# Patient Record
Sex: FEMALE | Race: WHITE | NOT HISPANIC OR LATINO | Employment: FULL TIME | URBAN - METROPOLITAN AREA
[De-identification: names, ages, dates, MRNs, and addresses within clinical notes are randomized per-mention and may not be internally consistent; named-entity substitution may affect disease eponyms.]

---

## 2017-03-28 ENCOUNTER — HOSPITAL ENCOUNTER (OUTPATIENT)
Dept: FAMILY MEDICINE CLINIC | Facility: CLINIC | Age: 22
Setting detail: SPECIMEN
Discharge: HOME | End: 2017-03-28
Attending: FAMILY MEDICINE | Admitting: FAMILY MEDICINE

## 2017-03-28 LAB
ANION GAP SERPL CALC-SCNC: 11.9 MMOL/L (ref 10–20)
BASOPHILS # BLD AUTO: 0.1 10*3/UL (ref 0–0.2)
BASOPHILS NFR BLD AUTO: 1 % (ref 0–2)
BUN SERPL-MCNC: 13 MG/DL (ref 8–20)
BUN/CREAT SERPL: 16.3 (ref 5.4–26.2)
CALCIUM SERPL-MCNC: 9.5 MG/DL (ref 8.9–10.3)
CHLORIDE SERPL-SCNC: 105 MMOL/L (ref 101–111)
CONV CO2: 26 MMOL/L (ref 22–32)
CREAT UR-MCNC: 0.8 MG/DL (ref 0.4–1)
DIFFERENTIAL METHOD BLD: (no result)
EOSINOPHIL # BLD AUTO: 0 10*3/UL (ref 0–0.3)
EOSINOPHIL # BLD AUTO: 1 % (ref 0–3)
ERYTHROCYTE [DISTWIDTH] IN BLOOD BY AUTOMATED COUNT: 14.7 % (ref 11.5–14.5)
GLUCOSE SERPL-MCNC: 88 MG/DL (ref 65–99)
HCT VFR BLD AUTO: 37.2 % (ref 35–49)
HGB BLD-MCNC: 12.7 G/DL (ref 12–15)
LYMPHOCYTES # BLD AUTO: 1.4 10*3/UL (ref 0.8–4.8)
LYMPHOCYTES NFR BLD AUTO: 27 % (ref 18–42)
MCH RBC QN AUTO: 28.9 PG (ref 26–32)
MCHC RBC AUTO-ENTMCNC: 34.1 G/DL (ref 32–36)
MCV RBC AUTO: 84.6 FL (ref 80–94)
MONOCYTES # BLD AUTO: 0.3 10*3/UL (ref 0.1–1.3)
MONOCYTES NFR BLD AUTO: 6 % (ref 2–11)
NEUTROPHILS # BLD AUTO: 3.3 10*3/UL (ref 2.3–8.6)
NEUTROPHILS NFR BLD AUTO: 65 % (ref 50–75)
NRBC BLD AUTO-RTO: 0 /100{WBCS}
NRBC/RBC NFR BLD MANUAL: 0 10*3/UL
PLATELET # BLD AUTO: 144 10*3/UL (ref 150–450)
PMV BLD AUTO: 11.2 FL (ref 7.4–10.4)
POTASSIUM SERPL-SCNC: 3.9 MMOL/L (ref 3.6–5.1)
RBC # BLD AUTO: 4.4 10*6/UL (ref 4–5.4)
SODIUM SERPL-SCNC: 139 MMOL/L (ref 136–144)
T3FREE SERPL-MCNC: 4.07 PG/ML (ref 2.39–6.79)
T4 FREE SERPL-MCNC: 0.82 NG/DL (ref 0.58–1.64)
TSH SERPL-ACNC: 1.45 UIU/ML (ref 0.34–5.6)
WBC # BLD AUTO: 5.1 10*3/UL (ref 4.5–11.5)

## 2017-04-07 ENCOUNTER — HOSPITAL ENCOUNTER (OUTPATIENT)
Dept: ONCOLOGY | Facility: CLINIC | Age: 22
Discharge: HOME | End: 2017-04-07
Attending: INTERNAL MEDICINE | Admitting: INTERNAL MEDICINE

## 2017-07-06 ENCOUNTER — HOSPITAL ENCOUNTER (OUTPATIENT)
Dept: ONCOLOGY | Facility: CLINIC | Age: 22
Discharge: HOME | End: 2017-07-06
Attending: INTERNAL MEDICINE | Admitting: INTERNAL MEDICINE

## 2018-08-14 ENCOUNTER — HOSPITAL ENCOUNTER (OUTPATIENT)
Dept: FAMILY MEDICINE CLINIC | Facility: CLINIC | Age: 23
Setting detail: SPECIMEN
Discharge: HOME | End: 2018-08-14
Attending: FAMILY MEDICINE | Admitting: FAMILY MEDICINE

## 2018-08-14 LAB
ANION GAP SERPL CALC-SCNC: 12.2 MMOL/L (ref 10–20)
BASOPHILS # BLD AUTO: 0 10*3/UL (ref 0–0.2)
BASOPHILS NFR BLD AUTO: 0 % (ref 0–2)
BUN SERPL-MCNC: 8 MG/DL (ref 8–20)
BUN/CREAT SERPL: 8.9 (ref 5.4–26.2)
CALCIUM SERPL-MCNC: 9.4 MG/DL (ref 8.9–10.3)
CHLORIDE SERPL-SCNC: 102 MMOL/L (ref 101–111)
CONV CO2: 24 MMOL/L (ref 22–32)
CREAT UR-MCNC: 0.9 MG/DL (ref 0.4–1)
DIFFERENTIAL METHOD BLD: (no result)
EOSINOPHIL # BLD AUTO: 0 % (ref 0–3)
EOSINOPHIL # BLD AUTO: 0 10*3/UL (ref 0–0.3)
ERYTHROCYTE [DISTWIDTH] IN BLOOD BY AUTOMATED COUNT: 14.4 % (ref 11.5–14.5)
GLUCOSE SERPL-MCNC: 96 MG/DL (ref 65–99)
HCT VFR BLD AUTO: 39.1 % (ref 35–49)
HGB BLD-MCNC: 13 G/DL (ref 12–15)
LYMPHOCYTES # BLD AUTO: 0.6 10*3/UL (ref 0.8–4.8)
LYMPHOCYTES NFR BLD AUTO: 6 % (ref 18–42)
MCH RBC QN AUTO: 27.8 PG (ref 26–32)
MCHC RBC AUTO-ENTMCNC: 33.3 G/DL (ref 32–36)
MCV RBC AUTO: 83.6 FL (ref 80–94)
MONOCYTES # BLD AUTO: 0.5 10*3/UL (ref 0.1–1.3)
MONOCYTES NFR BLD AUTO: 5 % (ref 2–11)
NEUTROPHILS # BLD AUTO: 8.8 10*3/UL (ref 2.3–8.6)
NEUTROPHILS NFR BLD AUTO: 89 % (ref 50–75)
NRBC BLD AUTO-RTO: 0 /100{WBCS}
NRBC/RBC NFR BLD MANUAL: 0 10*3/UL
PLATELET # BLD AUTO: 156 10*3/UL (ref 150–450)
PMV BLD AUTO: 11.2 FL (ref 7.4–10.4)
POTASSIUM SERPL-SCNC: 4.2 MMOL/L (ref 3.6–5.1)
RBC # BLD AUTO: 4.67 10*6/UL (ref 4–5.4)
SODIUM SERPL-SCNC: 134 MMOL/L (ref 136–144)
WBC # BLD AUTO: 9.9 10*3/UL (ref 4.5–11.5)

## 2020-01-21 ENCOUNTER — OFFICE VISIT (OUTPATIENT)
Dept: FAMILY MEDICINE CLINIC | Facility: CLINIC | Age: 25
End: 2020-01-21

## 2020-01-21 VITALS
OXYGEN SATURATION: 100 % | HEART RATE: 104 BPM | TEMPERATURE: 97.6 F | BODY MASS INDEX: 24.36 KG/M2 | DIASTOLIC BLOOD PRESSURE: 71 MMHG | WEIGHT: 174 LBS | HEIGHT: 71 IN | SYSTOLIC BLOOD PRESSURE: 123 MMHG

## 2020-01-21 DIAGNOSIS — F41.9 ANXIETY DISORDER, UNSPECIFIED TYPE: ICD-10-CM

## 2020-01-21 DIAGNOSIS — F98.8 ATTENTION DEFICIT DISORDER, UNSPECIFIED HYPERACTIVITY PRESENCE: Primary | ICD-10-CM

## 2020-01-21 PROCEDURE — 99213 OFFICE O/P EST LOW 20 MIN: CPT | Performed by: FAMILY MEDICINE

## 2020-01-21 RX ORDER — DEXTROAMPHETAMINE SACCHARATE, AMPHETAMINE ASPARTATE, DEXTROAMPHETAMINE SULFATE AND AMPHETAMINE SULFATE 3.75; 3.75; 3.75; 3.75 MG/1; MG/1; MG/1; MG/1
15 TABLET ORAL DAILY
Qty: 30 TABLET | Refills: 0 | Status: SHIPPED | OUTPATIENT
Start: 2020-01-21 | End: 2020-04-30 | Stop reason: SDUPTHER

## 2020-01-21 NOTE — PROGRESS NOTES
Subjective   Zoë Jarquin is a 24 y.o. female.     History of Present Illness     ADHD  Patient seen today for initial ADD/ADHD evaluation.  The patient complains of inattention, anxiety, decrease concentration and focusing.  The school notes that the child is having disruptive behavior and discipline problems.  The patient has a known history of ADHD as a childhood and she was on the medication for 6- 12 months.    The following portions of the patient's history were reviewed and updated as appropriate: past medical history, past social history, past surgical history and problem list.    Review of Systems   Constitutional: Negative for activity change, fatigue and unexpected weight loss.   Respiratory: Negative for shortness of breath.    Cardiovascular: Negative for chest pain and palpitations.   Gastrointestinal: Negative for abdominal pain.   Neurological: Negative for dizziness and headache.   Psychiatric/Behavioral: Positive for decreased concentration. Negative for sleep disturbance, suicidal ideas and depressed mood. The patient is nervous/anxious.        Objective   Physical Exam   Constitutional: She is oriented to person, place, and time. She appears well-developed.   Eyes: Pupils are equal, round, and reactive to light. EOM are normal.   Cardiovascular: Normal rate, regular rhythm and normal heart sounds.   Pulmonary/Chest: Effort normal and breath sounds normal. She has no wheezes.   Abdominal: Soft. Bowel sounds are normal.   Musculoskeletal: Normal range of motion.   Neurological: She is alert and oriented to person, place, and time.   Psychiatric: She has a normal mood and affect. Her behavior is normal.   Vitals reviewed.        Assessment/Plan   Problems Addressed this Visit        Other    Anxiety disorder     Psychological condition is unchanged.  Regular aerobic exercise.  Psychological condition  will be reassessed in 4 weeks.         Relevant Medications    amphetamine-dextroamphetamine  (ADDERALL) 15 MG tablet    Attention deficit disorder - Primary     Psychological condition is newly identified.  Discussed medications risks side effects.  Rx Adderall 15 mg p.o. daily in the morning.  Regular aerobic exercise.  Medication changes per orders.  Psychological condition  will be reassessed in 4 weeks.         Relevant Medications    amphetamine-dextroamphetamine (ADDERALL) 15 MG tablet

## 2020-01-21 NOTE — ASSESSMENT & PLAN NOTE
Psychological condition is unchanged.  Regular aerobic exercise.  Psychological condition  will be reassessed in 4 weeks.

## 2020-01-21 NOTE — ASSESSMENT & PLAN NOTE
Psychological condition is newly identified.  Discussed medications risks side effects.  Rx Adderall 15 mg p.o. daily in the morning.  Regular aerobic exercise.  Medication changes per orders.  Psychological condition  will be reassessed in 4 weeks.

## 2020-04-30 ENCOUNTER — TELEMEDICINE (OUTPATIENT)
Dept: FAMILY MEDICINE CLINIC | Facility: CLINIC | Age: 25
End: 2020-04-30

## 2020-04-30 DIAGNOSIS — F98.8 ATTENTION DEFICIT DISORDER, UNSPECIFIED HYPERACTIVITY PRESENCE: Primary | ICD-10-CM

## 2020-04-30 DIAGNOSIS — F41.9 ANXIETY DISORDER, UNSPECIFIED TYPE: ICD-10-CM

## 2020-04-30 PROCEDURE — 99213 OFFICE O/P EST LOW 20 MIN: CPT | Performed by: FAMILY MEDICINE

## 2020-04-30 RX ORDER — DEXTROAMPHETAMINE SACCHARATE, AMPHETAMINE ASPARTATE, DEXTROAMPHETAMINE SULFATE AND AMPHETAMINE SULFATE 3.75; 3.75; 3.75; 3.75 MG/1; MG/1; MG/1; MG/1
15 TABLET ORAL DAILY
Qty: 30 TABLET | Refills: 0 | Status: SHIPPED | OUTPATIENT
Start: 2020-04-30 | End: 2020-07-17 | Stop reason: SDUPTHER

## 2020-04-30 RX ORDER — CITALOPRAM 10 MG/1
10 TABLET ORAL DAILY
Qty: 30 TABLET | Refills: 3 | Status: SHIPPED | OUTPATIENT
Start: 2020-04-30 | End: 2020-07-17

## 2020-04-30 NOTE — ASSESSMENT & PLAN NOTE
Psychological condition is improving with treatment.  Continue current treatment regimen.  Regular aerobic exercise.  Psychological condition  will be reassessed in 4 weeks.    This is a video visit, Managed by [employer]. was used to complete this visit. Total time of discussion was 15 minutes.

## 2020-04-30 NOTE — PROGRESS NOTES
Subjective   Zoë Jarquin is a 24 y.o. female.     You have chosen to receive care through a telehealth visit.  Do you consent to use a video/audio connection for your medical care today? Yes    Video visit in view of COVID-19 pandemic.  Patient present with follow-up on ADD and anxiety.       ADD  The patient present  for 3 months f/u on ADD and med refill. She complains of anxiety but denies difficulity concentration, focusing and  insomnia.  She takes Adderall which helps with ADD symptoms.         The following portions of the patient's history were reviewed and updated as appropriate: past medical history, past social history, past surgical history and problem list.    Review of Systems   Constitutional: Negative for fatigue and fever.   HENT: Negative for trouble swallowing.    Respiratory: Negative for shortness of breath.    Cardiovascular: Negative for chest pain and palpitations.   Gastrointestinal: Negative for abdominal pain.   Neurological: Negative for dizziness and headache.   Psychiatric/Behavioral: Negative for behavioral problems, decreased concentration, sleep disturbance, suicidal ideas and depressed mood. The patient is nervous/anxious.        Objective   Physical Exam   Constitutional: She is oriented to person, place, and time. She appears well-developed.   Pulmonary/Chest: Effort normal.   Neurological: She is alert and oriented to person, place, and time.   Psychiatric: She has a normal mood and affect. Her behavior is normal.         Assessment/Plan   Problems Addressed this Visit        Other    Anxiety disorder     Psychological condition is worsening.  Discussed medication, add Celexa 10 mg p.o. daily  Regular aerobic exercise.  Medication changes per orders.  Psychological condition  will be reassessed in 4 weeks.         Relevant Medications    amphetamine-dextroamphetamine (Adderall) 15 MG tablet    citalopram (CeleXA) 10 MG tablet    Attention deficit disorder - Primary      Psychological condition is improving with treatment.  Continue current treatment regimen.  Regular aerobic exercise.  Psychological condition  will be reassessed in 4 weeks.    This is a video visit, [employer] video alley. was used to complete this visit. Total time of discussion was 15 minutes.         Relevant Medications    amphetamine-dextroamphetamine (Adderall) 15 MG tablet    citalopram (CeleXA) 10 MG tablet

## 2020-04-30 NOTE — ASSESSMENT & PLAN NOTE
Psychological condition is worsening.  Discussed medication, add Celexa 10 mg p.o. daily  Regular aerobic exercise.  Medication changes per orders.  Psychological condition  will be reassessed in 4 weeks.

## 2020-07-17 ENCOUNTER — TELEMEDICINE (OUTPATIENT)
Dept: FAMILY MEDICINE CLINIC | Facility: CLINIC | Age: 25
End: 2020-07-17

## 2020-07-17 DIAGNOSIS — F98.8 ATTENTION DEFICIT DISORDER, UNSPECIFIED HYPERACTIVITY PRESENCE: ICD-10-CM

## 2020-07-17 DIAGNOSIS — F41.1 GENERALIZED ANXIETY DISORDER: Primary | ICD-10-CM

## 2020-07-17 PROCEDURE — 99213 OFFICE O/P EST LOW 20 MIN: CPT | Performed by: FAMILY MEDICINE

## 2020-07-17 RX ORDER — CITALOPRAM 10 MG/1
20 TABLET ORAL DAILY
Qty: 30 TABLET | Refills: 3 | Status: SHIPPED | OUTPATIENT
Start: 2020-07-17 | End: 2020-07-17 | Stop reason: SDUPTHER

## 2020-07-17 RX ORDER — DEXTROAMPHETAMINE SACCHARATE, AMPHETAMINE ASPARTATE, DEXTROAMPHETAMINE SULFATE AND AMPHETAMINE SULFATE 3.75; 3.75; 3.75; 3.75 MG/1; MG/1; MG/1; MG/1
15 TABLET ORAL DAILY
Qty: 30 TABLET | Refills: 0 | Status: SHIPPED | OUTPATIENT
Start: 2020-07-17 | End: 2020-08-24 | Stop reason: SDUPTHER

## 2020-07-17 NOTE — PROGRESS NOTES
Subjective   Zoë Jarquin is a 24 y.o. female.     You have chosen to receive care through a telehealth visit.  Do you consent to use a video/audio connection for your medical care today? Yes    Video visit in view of COVID-19 pandemic.  The patient presents  for 3 months f/u on ADD, anxiety and med refill. She complains of anxiety but denies difficulity concentration, focusing, depression, and  insomnia.  She is taking Adderall tolerating well denies any medication related side effects.            The following portions of the patient's history were reviewed and updated as appropriate: past medical history, past social history, past surgical history and problem list.    Review of Systems   Constitutional: Negative for fatigue and fever.   Respiratory: Negative for shortness of breath.    Cardiovascular: Negative for chest pain and palpitations.   Neurological: Negative for headache.   Psychiatric/Behavioral: Positive for stress. Negative for behavioral problems, decreased concentration, sleep disturbance, suicidal ideas and depressed mood. The patient is nervous/anxious.        Objective   Physical Exam   Constitutional: She is oriented to person, place, and time. She appears well-developed. No distress.   Pulmonary/Chest: Effort normal.   Neurological: She is alert and oriented to person, place, and time.   Psychiatric: She has a normal mood and affect. Her behavior is normal.         Assessment/Plan   Problems Addressed this Visit        Other    Anxiety disorder - Primary     Anxiety is unchanged increase Celexa 20 mg p.o. Daily.  The patient verified not suicidal at this time.         Relevant Medications    citalopram (CeleXA) 10 MG tablet    amphetamine-dextroamphetamine (Adderall) 15 MG tablet    Attention deficit disorder     Psychological condition is improving with treatment.  Continue current treatment regimen.  Regular aerobic exercise.  Psychological condition  will be reassessed in 2 months.    This  is a video visit [email or web address].was used to complete this visit. Total time of discussion was 15 minutes.         Relevant Medications    citalopram (CeleXA) 10 MG tablet    amphetamine-dextroamphetamine (Adderall) 15 MG tablet

## 2020-07-17 NOTE — PATIENT INSTRUCTIONS
Generalized Anxiety Disorder, Adult  Generalized anxiety disorder (JUANITA) is a mental health disorder. People with this condition constantly worry about everyday events. Unlike normal anxiety, worry related to JUANITA is not triggered by a specific event. These worries also do not fade or get better with time. JUANITA interferes with life functions, including relationships, work, and school.  JUANITA can vary from mild to severe. People with severe JUANITA can have intense waves of anxiety with physical symptoms (panic attacks).  What are the causes?  The exact cause of JUANITA is not known.  What increases the risk?  This condition is more likely to develop in:  · Women.  · People who have a family history of anxiety disorders.  · People who are very shy.  · People who experience very stressful life events, such as the death of a loved one.  · People who have a very stressful family environment.  What are the signs or symptoms?  People with JUANITA often worry excessively about many things in their lives, such as their health and family. They may also be overly concerned about:  · Doing well at work.  · Being on time.  · Natural disasters.  · Friendships.  Physical symptoms of JUANITA include:  · Fatigue.  · Muscle tension or having muscle twitches.  · Trembling or feeling shaky.  · Being easily startled.  · Feeling like your heart is pounding or racing.  · Feeling out of breath or like you cannot take a deep breath.  · Having trouble falling asleep or staying asleep.  · Sweating.  · Nausea, diarrhea, or irritable bowel syndrome (IBS).  · Headaches.  · Trouble concentrating or remembering facts.  · Restlessness.  · Irritability.  How is this diagnosed?  Your health care provider can diagnose JUANITA based on your symptoms and medical history. You will also have a physical exam. The health care provider will ask specific questions about your symptoms, including how severe they are, when they started, and if they come and go. Your health care  provider may ask you about your use of alcohol or drugs, including prescription medicines. Your health care provider may refer you to a mental health specialist for further evaluation.  Your health care provider will do a thorough examination and may perform additional tests to rule out other possible causes of your symptoms.  To be diagnosed with JUANITA, a person must have anxiety that:  · Is out of his or her control.  · Affects several different aspects of his or her life, such as work and relationships.  · Causes distress that makes him or her unable to take part in normal activities.  · Includes at least three physical symptoms of JUANITA, such as restlessness, fatigue, trouble concentrating, irritability, muscle tension, or sleep problems.  Before your health care provider can confirm a diagnosis of JUANITA, these symptoms must be present more days than they are not, and they must last for six months or longer.  How is this treated?  The following therapies are usually used to treat JUANITA:  · Medicine. Antidepressant medicine is usually prescribed for long-term daily control. Antianxiety medicines may be added in severe cases, especially when panic attacks occur.  · Talk therapy (psychotherapy). Certain types of talk therapy can be helpful in treating JUANITA by providing support, education, and guidance. Options include:  ? Cognitive behavioral therapy (CBT). People learn coping skills and techniques to ease their anxiety. They learn to identify unrealistic or negative thoughts and behaviors and to replace them with positive ones.  ? Acceptance and commitment therapy (ACT). This treatment teaches people how to be mindful as a way to cope with unwanted thoughts and feelings.  ? Biofeedback. This process trains you to manage your body's response (physiological response) through breathing techniques and relaxation methods. You will work with a therapist while machines are used to monitor your physical symptoms.  · Stress  management techniques. These include yoga, meditation, and exercise.  A mental health specialist can help determine which treatment is best for you. Some people see improvement with one type of therapy. However, other people require a combination of therapies.  Follow these instructions at home:  · Take over-the-counter and prescription medicines only as told by your health care provider.  · Try to maintain a normal routine.  · Try to anticipate stressful situations and allow extra time to manage them.  · Practice any stress management or self-calming techniques as taught by your health care provider.  · Do not punish yourself for setbacks or for not making progress.  · Try to recognize your accomplishments, even if they are small.  · Keep all follow-up visits as told by your health care provider. This is important.  Contact a health care provider if:  · Your symptoms do not get better.  · Your symptoms get worse.  · You have signs of depression, such as:  ? A persistently sad, cranky, or irritable mood.  ? Loss of enjoyment in activities that used to bring you angely.  ? Change in weight or eating.  ? Changes in sleeping habits.  ? Avoiding friends or family members.  ? Loss of energy for normal tasks.  ? Feelings of guilt or worthlessness.  Get help right away if:  · You have serious thoughts about hurting yourself or others.  If you ever feel like you may hurt yourself or others, or have thoughts about taking your own life, get help right away. You can go to your nearest emergency department or call:  · Your local emergency services (911 in the [place]  · A suicide crisis helpline, such as the National Suicide Prevention Lifeline at 1-340.337.7219. This is open 24 hours a day.  Summary  · Generalized anxiety disorder (JUANITA) is a mental health disorder that involves worry that is not triggered by a specific event.  · People with JUANITA often worry excessively about many things in their lives, such as their health and  family.  · JUANITA may cause physical symptoms such as restlessness, trouble concentrating, sleep problems, frequent sweating, nausea, diarrhea, headaches, and trembling or muscle twitching.  · A mental health specialist can help determine which treatment is best for you. Some people see improvement with one type of therapy. However, other people require a combination of therapies.  This information is not intended to replace advice given to you by your health care provider. Make sure you discuss any questions you have with your health care provider.  Document Released: 04/14/2014 Document Revised: 11/30/2018 Document Reviewed: 11/07/2017  Elsevier Patient Education © 2020 [employer]

## 2020-07-17 NOTE — ASSESSMENT & PLAN NOTE
Anxiety is unchanged increase Celexa 20 mg p.o. Daily.  The patient verified not suicidal at this time.

## 2020-07-17 NOTE — TELEPHONE ENCOUNTER
Pharmacy called and requested an alternative for pts citalopram because insurance only wants to pay for 1.5 tablets by mouth daily

## 2020-07-17 NOTE — ASSESSMENT & PLAN NOTE
Psychological condition is improving with treatment.  Continue current treatment regimen.  Regular aerobic exercise.  Psychological condition  will be reassessed in 2 months.    This is a video visit [email or web address].was used to complete this visit. Total time of discussion was 15 minutes.

## 2020-07-18 RX ORDER — DEXTROAMPHETAMINE SACCHARATE, AMPHETAMINE ASPARTATE, DEXTROAMPHETAMINE SULFATE AND AMPHETAMINE SULFATE 3.75; 3.75; 3.75; 3.75 MG/1; MG/1; MG/1; MG/1
15 TABLET ORAL DAILY
Qty: 30 TABLET | Refills: 0 | OUTPATIENT
Start: 2020-07-18

## 2020-07-18 RX ORDER — CITALOPRAM 20 MG/1
20 TABLET ORAL DAILY
Qty: 30 TABLET | Refills: 3 | Status: SHIPPED | OUTPATIENT
Start: 2020-07-18 | End: 2020-08-28

## 2020-08-24 ENCOUNTER — TELEPHONE (OUTPATIENT)
Dept: FAMILY MEDICINE CLINIC | Facility: CLINIC | Age: 25
End: 2020-08-24

## 2020-08-24 DIAGNOSIS — F98.8 ATTENTION DEFICIT DISORDER, UNSPECIFIED HYPERACTIVITY PRESENCE: ICD-10-CM

## 2020-08-24 RX ORDER — DEXTROAMPHETAMINE SACCHARATE, AMPHETAMINE ASPARTATE, DEXTROAMPHETAMINE SULFATE AND AMPHETAMINE SULFATE 3.75; 3.75; 3.75; 3.75 MG/1; MG/1; MG/1; MG/1
15 TABLET ORAL DAILY
Qty: 30 TABLET | Refills: 0 | Status: SHIPPED | OUTPATIENT
Start: 2020-08-24 | End: 2020-10-06 | Stop reason: SDUPTHER

## 2020-08-28 ENCOUNTER — OFFICE VISIT (OUTPATIENT)
Dept: FAMILY MEDICINE CLINIC | Facility: CLINIC | Age: 25
End: 2020-08-28

## 2020-08-28 VITALS
HEART RATE: 87 BPM | DIASTOLIC BLOOD PRESSURE: 67 MMHG | TEMPERATURE: 97.8 F | WEIGHT: 160 LBS | SYSTOLIC BLOOD PRESSURE: 117 MMHG | BODY MASS INDEX: 22.4 KG/M2 | OXYGEN SATURATION: 98 % | HEIGHT: 71 IN

## 2020-08-28 DIAGNOSIS — F98.8 ATTENTION DEFICIT DISORDER, UNSPECIFIED HYPERACTIVITY PRESENCE: ICD-10-CM

## 2020-08-28 DIAGNOSIS — F41.1 GENERALIZED ANXIETY DISORDER: Primary | ICD-10-CM

## 2020-08-28 DIAGNOSIS — Z20.822 CLOSE EXPOSURE TO COVID-19 VIRUS: ICD-10-CM

## 2020-08-28 PROCEDURE — 99213 OFFICE O/P EST LOW 20 MIN: CPT | Performed by: FAMILY MEDICINE

## 2020-08-28 RX ORDER — VENLAFAXINE HYDROCHLORIDE 37.5 MG/1
37.5 CAPSULE, EXTENDED RELEASE ORAL DAILY
Qty: 30 CAPSULE | Refills: 3 | Status: SHIPPED | OUTPATIENT
Start: 2020-08-28 | End: 2020-11-30 | Stop reason: SDUPTHER

## 2020-08-28 NOTE — PROGRESS NOTES
Subjective   Zoë Jarquin is a 24 y.o. female.     Anxiety   Presents for follow-up visit. Symptoms include excessive worry and nervous/anxious behavior. Patient reports no chest pain, depressed mood, insomnia, palpitations, panic, shortness of breath or suicidal ideas. Symptoms occur most days. The quality of sleep is fair.       ADD   The patient also present  for 3 months f/u on ADD and med refill. She is doing well, tolerating adderall well , denies difficulity concentration, focusing,  depression, and  insomnia.        The following portions of the patient's history were reviewed and updated as appropriate: past medical history, past social history, past surgical history and problem list.    Review of Systems   Constitutional: Positive for fatigue.   Respiratory: Negative for shortness of breath.    Cardiovascular: Negative for chest pain and palpitations.   Endocrine: Negative for cold intolerance.   Neurological: Negative for headache.   Psychiatric/Behavioral: Positive for stress. Negative for sleep disturbance, suicidal ideas and depressed mood. The patient is nervous/anxious. The patient does not have insomnia.        Objective   Physical Exam   Constitutional: She is oriented to person, place, and time. She appears well-developed. No distress.   Cardiovascular: Normal rate and normal heart sounds.   Pulmonary/Chest: Effort normal and breath sounds normal. She has no wheezes.   Neurological: She is alert and oriented to person, place, and time.   Psychiatric: She has a normal mood and affect.   Vitals reviewed.    Vitals:    08/28/20 0907   BP: 117/67   Pulse: 87   Temp: 97.8 °F (36.6 °C)   SpO2: 98%     Current Outpatient Medications on File Prior to Visit   Medication Sig Dispense Refill   • amphetamine-dextroamphetamine (Adderall) 15 MG tablet Take 1 tablet by mouth Daily. 30 tablet 0     No current facility-administered medications on file prior to visit.            Assessment/Plan   Problems  Addressed this Visit        Other    Anxiety disorder - Primary     Psychological condition is unchanged.  Stopped Citalopram ( not effective), new Rx Effexor.  Regular aerobic exercise.  Medication changes per orders.  Psychological condition  will be reassessed in 3 months.         Relevant Medications    venlafaxine XR (EFFEXOR-XR) 37.5 MG 24 hr capsule    Other Relevant Orders    Ambulatory Referral to Psychiatry    Attention deficit disorder     Psychological condition is improving with treatment.  Continue current treatment regimen.  Psychological condition  will be reassessed in 3 months.         Relevant Medications    venlafaxine XR (EFFEXOR-XR) 37.5 MG 24 hr capsule    Other Relevant Orders    Ambulatory Referral to Psychiatry      Other Visit Diagnoses     Close exposure to COVID-19 virus        Relevant Orders    COVID-19,[facility] ROUTINE, NP/OP SWAB IN TRANSPORT MEDIA OR ESWAB 72 HR TAT - Swab, Nasopharynx

## 2020-08-29 NOTE — ASSESSMENT & PLAN NOTE
Psychological condition is unchanged.  Stopped Citalopram ( not effective), new Rx Effexor.  Regular aerobic exercise.  Medication changes per orders.  Psychological condition  will be reassessed in 3 months.

## 2020-08-31 RX ORDER — CITALOPRAM 10 MG/1
TABLET ORAL
Qty: 90 TABLET | Refills: 1 | OUTPATIENT
Start: 2020-08-31

## 2020-10-06 DIAGNOSIS — F98.8 ATTENTION DEFICIT DISORDER, UNSPECIFIED HYPERACTIVITY PRESENCE: ICD-10-CM

## 2020-10-06 RX ORDER — DEXTROAMPHETAMINE SACCHARATE, AMPHETAMINE ASPARTATE, DEXTROAMPHETAMINE SULFATE AND AMPHETAMINE SULFATE 3.75; 3.75; 3.75; 3.75 MG/1; MG/1; MG/1; MG/1
15 TABLET ORAL DAILY
Qty: 30 TABLET | Refills: 0 | Status: SHIPPED | OUTPATIENT
Start: 2020-10-06 | End: 2020-11-07 | Stop reason: SDUPTHER

## 2020-11-07 DIAGNOSIS — F98.8 ATTENTION DEFICIT DISORDER, UNSPECIFIED HYPERACTIVITY PRESENCE: ICD-10-CM

## 2020-11-08 RX ORDER — DEXTROAMPHETAMINE SACCHARATE, AMPHETAMINE ASPARTATE, DEXTROAMPHETAMINE SULFATE AND AMPHETAMINE SULFATE 3.75; 3.75; 3.75; 3.75 MG/1; MG/1; MG/1; MG/1
15 TABLET ORAL DAILY
Qty: 30 TABLET | Refills: 0 | Status: SHIPPED | OUTPATIENT
Start: 2020-11-08 | End: 2020-12-11 | Stop reason: SDUPTHER

## 2020-11-30 ENCOUNTER — OFFICE VISIT (OUTPATIENT)
Dept: FAMILY MEDICINE CLINIC | Facility: CLINIC | Age: 25
End: 2020-11-30

## 2020-11-30 VITALS
SYSTOLIC BLOOD PRESSURE: 129 MMHG | BODY MASS INDEX: 22.68 KG/M2 | WEIGHT: 162 LBS | HEIGHT: 71 IN | DIASTOLIC BLOOD PRESSURE: 83 MMHG | HEART RATE: 85 BPM | OXYGEN SATURATION: 99 % | TEMPERATURE: 97.5 F

## 2020-11-30 DIAGNOSIS — F41.1 GENERALIZED ANXIETY DISORDER: Primary | ICD-10-CM

## 2020-11-30 DIAGNOSIS — F98.8 ATTENTION DEFICIT DISORDER, UNSPECIFIED HYPERACTIVITY PRESENCE: ICD-10-CM

## 2020-11-30 PROCEDURE — 99213 OFFICE O/P EST LOW 20 MIN: CPT | Performed by: FAMILY MEDICINE

## 2020-11-30 RX ORDER — VENLAFAXINE HYDROCHLORIDE 37.5 MG/1
37.5 CAPSULE, EXTENDED RELEASE ORAL DAILY
Qty: 30 CAPSULE | Refills: 3 | Status: SHIPPED | OUTPATIENT
Start: 2020-11-30 | End: 2020-12-18

## 2020-11-30 NOTE — PROGRESS NOTES
Subjective   Zoë Jarquin is a 24 y.o. female.     Patient present with 3-month follow-up on anxiety and ADD.  She complains of anxiety and stress but denies difficulity concentration, focusing and  insomnia.  She is taking Adderall and tolerating well.       Anxiety  Presents for follow-up visit. Symptoms include depressed mood, dizziness, malaise and nervous/anxious behavior. Patient reports no chest pain, decreased concentration, hyperventilation, insomnia, irritability, nausea, palpitations, panic, shortness of breath or suicidal ideas. Symptoms occur most days. The severity of symptoms is moderate. The quality of sleep is fair.            The following portions of the patient's history were reviewed and updated as appropriate: past medical history, past social history, past surgical history and problem list.    Review of Systems   Constitutional: Negative for fatigue, fever and irritability.   HENT: Negative for trouble swallowing.    Respiratory: Negative for chest tightness and shortness of breath.    Cardiovascular: Negative for chest pain and palpitations.   Gastrointestinal: Negative for abdominal pain and nausea.   Neurological: Positive for dizziness. Negative for headache.   Psychiatric/Behavioral: Positive for depressed mood and stress. Negative for decreased concentration, sleep disturbance and suicidal ideas. The patient is nervous/anxious. The patient does not have insomnia.        Objective   Physical Exam  Vitals signs reviewed.   Constitutional:       General: She is not in acute distress.     Appearance: She is well-developed.   Eyes:      Conjunctiva/sclera: Conjunctivae normal.      Pupils: Pupils are equal, round, and reactive to light.   Neck:      Musculoskeletal: Normal range of motion and neck supple.      Thyroid: No thyromegaly.   Pulmonary:      Effort: Pulmonary effort is normal.      Breath sounds: Normal breath sounds.   Abdominal:      General: Bowel sounds are normal.       Palpations: Abdomen is soft.   Neurological:      General: No focal deficit present.      Mental Status: She is alert and oriented to person, place, and time.   Psychiatric:         Mood and Affect: Mood normal.         Behavior: Behavior normal.       Vitals:    11/30/20 1034   BP: 129/83   Pulse: 85   Temp: 97.5 °F (36.4 °C)   SpO2: 99%     Current Outpatient Medications on File Prior to Visit   Medication Sig Dispense Refill   • amphetamine-dextroamphetamine (Adderall) 15 MG tablet Take 1 tablet by mouth Daily. 30 tablet 0   • [DISCONTINUED] venlafaxine XR (EFFEXOR-XR) 37.5 MG 24 hr capsule Take 1 capsule by mouth Daily. 30 capsule 3     No current facility-administered medications on file prior to visit.            Assessment/Plan   Problems Addressed this Visit        Other    Anxiety disorder - Primary     Psychological condition is unchanged.  Continue current treatment regimen.  Regular aerobic exercise.  Referral to psychiatry.  Psychological condition  will be reassessed in 3 months.         Relevant Medications    venlafaxine XR (EFFEXOR-XR) 37.5 MG 24 hr capsule    Other Relevant Orders    Ambulatory Referral to Psychiatry    Attention deficit disorder     Psychological condition is improving with treatment.  Continue current treatment regimen.  Psychological condition  will be reassessed in 3 months.         Relevant Medications    venlafaxine XR (EFFEXOR-XR) 37.5 MG 24 hr capsule    Other Relevant Orders    Ambulatory Referral to Psychiatry      Diagnoses       Codes Comments    Generalized anxiety disorder    -  Primary ICD-10-CM: F41.1  ICD-9-CM: 300.02     Attention deficit disorder, unspecified hyperactivity presence     ICD-10-CM: F98.8  ICD-9-CM: 314.00

## 2020-11-30 NOTE — ASSESSMENT & PLAN NOTE
Psychological condition is unchanged.  Continue current treatment regimen.  Regular aerobic exercise.  Referral to psychiatry.  Psychological condition  will be reassessed in 3 months.

## 2020-12-11 DIAGNOSIS — F98.8 ATTENTION DEFICIT DISORDER, UNSPECIFIED HYPERACTIVITY PRESENCE: ICD-10-CM

## 2020-12-11 RX ORDER — DEXTROAMPHETAMINE SACCHARATE, AMPHETAMINE ASPARTATE, DEXTROAMPHETAMINE SULFATE AND AMPHETAMINE SULFATE 3.75; 3.75; 3.75; 3.75 MG/1; MG/1; MG/1; MG/1
15 TABLET ORAL DAILY
Qty: 30 TABLET | Refills: 0 | Status: SHIPPED | OUTPATIENT
Start: 2020-12-11 | End: 2021-01-09 | Stop reason: SDUPTHER

## 2020-12-18 RX ORDER — VENLAFAXINE HYDROCHLORIDE 37.5 MG/1
CAPSULE, EXTENDED RELEASE ORAL
Qty: 90 CAPSULE | Refills: 1 | Status: SHIPPED | OUTPATIENT
Start: 2020-12-18 | End: 2021-01-09 | Stop reason: SDUPTHER

## 2021-01-09 DIAGNOSIS — F98.8 ATTENTION DEFICIT DISORDER, UNSPECIFIED HYPERACTIVITY PRESENCE: ICD-10-CM

## 2021-01-10 RX ORDER — DEXTROAMPHETAMINE SACCHARATE, AMPHETAMINE ASPARTATE, DEXTROAMPHETAMINE SULFATE AND AMPHETAMINE SULFATE 3.75; 3.75; 3.75; 3.75 MG/1; MG/1; MG/1; MG/1
15 TABLET ORAL DAILY
Qty: 30 TABLET | Refills: 0 | Status: SHIPPED | OUTPATIENT
Start: 2021-01-10 | End: 2021-02-11 | Stop reason: SDUPTHER

## 2021-01-10 RX ORDER — VENLAFAXINE HYDROCHLORIDE 37.5 MG/1
37.5 CAPSULE, EXTENDED RELEASE ORAL DAILY
Qty: 90 CAPSULE | Refills: 1 | Status: SHIPPED | OUTPATIENT
Start: 2021-01-10 | End: 2021-04-29 | Stop reason: SDUPTHER

## 2021-02-11 DIAGNOSIS — F98.8 ATTENTION DEFICIT DISORDER, UNSPECIFIED HYPERACTIVITY PRESENCE: ICD-10-CM

## 2021-02-12 RX ORDER — DEXTROAMPHETAMINE SACCHARATE, AMPHETAMINE ASPARTATE, DEXTROAMPHETAMINE SULFATE AND AMPHETAMINE SULFATE 3.75; 3.75; 3.75; 3.75 MG/1; MG/1; MG/1; MG/1
15 TABLET ORAL DAILY
Qty: 30 TABLET | Refills: 0 | Status: SHIPPED | OUTPATIENT
Start: 2021-02-12 | End: 2021-03-22 | Stop reason: SDUPTHER

## 2021-03-22 DIAGNOSIS — F98.8 ATTENTION DEFICIT DISORDER, UNSPECIFIED HYPERACTIVITY PRESENCE: ICD-10-CM

## 2021-03-22 RX ORDER — DEXTROAMPHETAMINE SACCHARATE, AMPHETAMINE ASPARTATE, DEXTROAMPHETAMINE SULFATE AND AMPHETAMINE SULFATE 3.75; 3.75; 3.75; 3.75 MG/1; MG/1; MG/1; MG/1
15 TABLET ORAL DAILY
Qty: 30 TABLET | Refills: 0 | OUTPATIENT
Start: 2021-03-22

## 2021-03-23 DIAGNOSIS — F98.8 ATTENTION DEFICIT DISORDER, UNSPECIFIED HYPERACTIVITY PRESENCE: ICD-10-CM

## 2021-03-24 RX ORDER — DEXTROAMPHETAMINE SACCHARATE, AMPHETAMINE ASPARTATE, DEXTROAMPHETAMINE SULFATE AND AMPHETAMINE SULFATE 3.75; 3.75; 3.75; 3.75 MG/1; MG/1; MG/1; MG/1
15 TABLET ORAL DAILY
Qty: 30 TABLET | Refills: 0 | OUTPATIENT
Start: 2021-03-24

## 2021-03-31 ENCOUNTER — TELEMEDICINE (OUTPATIENT)
Dept: FAMILY MEDICINE CLINIC | Facility: CLINIC | Age: 26
End: 2021-03-31

## 2021-03-31 DIAGNOSIS — F98.8 ATTENTION DEFICIT DISORDER, UNSPECIFIED HYPERACTIVITY PRESENCE: Primary | ICD-10-CM

## 2021-03-31 DIAGNOSIS — F41.1 GENERALIZED ANXIETY DISORDER: ICD-10-CM

## 2021-03-31 PROCEDURE — 99213 OFFICE O/P EST LOW 20 MIN: CPT | Performed by: FAMILY MEDICINE

## 2021-03-31 RX ORDER — DEXTROAMPHETAMINE SACCHARATE, AMPHETAMINE ASPARTATE, DEXTROAMPHETAMINE SULFATE AND AMPHETAMINE SULFATE 3.75; 3.75; 3.75; 3.75 MG/1; MG/1; MG/1; MG/1
15 TABLET ORAL DAILY
Qty: 30 TABLET | Refills: 0 | Status: SHIPPED | OUTPATIENT
Start: 2021-03-31 | End: 2021-04-29 | Stop reason: SDUPTHER

## 2021-03-31 NOTE — ASSESSMENT & PLAN NOTE
Reason for Disposition   [1] Caller requesting NON-URGENT health information AND [2] PCP's office is the best resource    Protocols used: INFORMATION ONLY CALL-A-AH    Pt called crying and very frustrated stated he spoke with [facility] staff several times today requesting his pain medication, and no one has called him back. Pt stated he does know know if the message is being relayed to Ms. Chapman. Pt stated he needs his medication for Physical Therapy and to be able to sleep at night. Pt stated he is in a lot of pain. Advised a high priority message will be sent to the Provider and office staff to follow up.    ADD Sx are  improving with treatment.  Continue current treatment regimen.  Regular aerobic exercise.  Psychological condition  will be reassessed in 3 months.    This is a video visit [employer] [facility] was used to complete this visit. Total time of discussion was 15 minutes.

## 2021-03-31 NOTE — PROGRESS NOTES
Subjective   Zoë Jarquin is a 25 y.o. female.     You have chosen to receive care through a telehealth visit.  Do you consent to use a video/audio connection for your medical care today? Yes.  Telehealth visit in view of COVID-19 pandemic.      Patient present with 3-month follow-up on anxiety and ADD.  The patient noticed improvement in symptoms. She denies difficulity concentration, focusing, anxiety and  insomnia.  She is taking Adderall and tolerating well. Patient reports no chest pain,  hyperventilation, insomnia, irritability, nausea, palpitations, panic, shortness of breath or suicidal ideas.        The following portions of the patient's history were reviewed and updated as appropriate: past medical history, past social history, past surgical history and problem list.    Review of Systems   Constitutional: Negative for fatigue.   Respiratory: Negative for shortness of breath.    Cardiovascular: Negative for chest pain and palpitations.   Neurological: Negative for headache.   Psychiatric/Behavioral: Negative for decreased concentration, sleep disturbance and depressed mood. The patient is not nervous/anxious.        Objective   Physical Exam  Neurological:      Mental Status: She is alert.   Psychiatric:         Mood and Affect: Mood normal.         Behavior: Behavior normal.       Current Outpatient Medications on File Prior to Visit   Medication Sig Dispense Refill   • amphetamine-dextroamphetamine (Adderall) 15 MG tablet Take 1 tablet by mouth Daily. 30 tablet 0   • venlafaxine XR (EFFEXOR-XR) 37.5 MG 24 hr capsule Take 1 capsule by mouth Daily. 90 capsule 1     No current facility-administered medications on file prior to visit.           Assessment/Plan   Problems Addressed this Visit        Mental Health    Anxiety disorder     Anxiety is improving.The patient verified not suicidal at this time, she will call our office if there is  any worsening of Sx or thoughts of doing harm arise.            Attention deficit disorder - Primary     ADD Sx are  improving with treatment.  Continue current treatment regimen.  Regular aerobic exercise.  Psychological condition  will be reassessed in 3 months.    This is a video visit [employer] [address] was used to complete this visit. Total time of discussion was 15 minutes.           Diagnoses       Codes Comments    Attention deficit disorder, unspecified hyperactivity presence    -  Primary ICD-10-CM: F98.8  ICD-9-CM: 314.00     Generalized anxiety disorder     ICD-10-CM: F41.1  ICD-9-CM: 300.02

## 2021-03-31 NOTE — ASSESSMENT & PLAN NOTE
Anxiety is improving.The patient verified not suicidal at this time, she will call our office if there is  any worsening of Sx or thoughts of doing harm arise.

## 2021-04-29 DIAGNOSIS — F98.8 ATTENTION DEFICIT DISORDER, UNSPECIFIED HYPERACTIVITY PRESENCE: ICD-10-CM

## 2021-04-30 RX ORDER — DEXTROAMPHETAMINE SACCHARATE, AMPHETAMINE ASPARTATE, DEXTROAMPHETAMINE SULFATE AND AMPHETAMINE SULFATE 3.75; 3.75; 3.75; 3.75 MG/1; MG/1; MG/1; MG/1
15 TABLET ORAL DAILY
Qty: 30 TABLET | Refills: 0 | Status: SHIPPED | OUTPATIENT
Start: 2021-04-30 | End: 2021-06-10 | Stop reason: SDUPTHER

## 2021-04-30 RX ORDER — VENLAFAXINE HYDROCHLORIDE 37.5 MG/1
37.5 CAPSULE, EXTENDED RELEASE ORAL DAILY
Qty: 90 CAPSULE | Refills: 0 | Status: SHIPPED | OUTPATIENT
Start: 2021-04-30 | End: 2021-08-02 | Stop reason: SDUPTHER

## 2021-06-10 DIAGNOSIS — F98.8 ATTENTION DEFICIT DISORDER, UNSPECIFIED HYPERACTIVITY PRESENCE: ICD-10-CM

## 2021-06-10 RX ORDER — DEXTROAMPHETAMINE SACCHARATE, AMPHETAMINE ASPARTATE, DEXTROAMPHETAMINE SULFATE AND AMPHETAMINE SULFATE 3.75; 3.75; 3.75; 3.75 MG/1; MG/1; MG/1; MG/1
15 TABLET ORAL DAILY
Qty: 30 TABLET | Refills: 0 | Status: SHIPPED | OUTPATIENT
Start: 2021-06-10 | End: 2021-06-13 | Stop reason: SDUPTHER

## 2021-06-13 DIAGNOSIS — F98.8 ATTENTION DEFICIT DISORDER, UNSPECIFIED HYPERACTIVITY PRESENCE: ICD-10-CM

## 2021-06-14 RX ORDER — DEXTROAMPHETAMINE SACCHARATE, AMPHETAMINE ASPARTATE, DEXTROAMPHETAMINE SULFATE AND AMPHETAMINE SULFATE 3.75; 3.75; 3.75; 3.75 MG/1; MG/1; MG/1; MG/1
15 TABLET ORAL DAILY
Qty: 30 TABLET | Refills: 0 | Status: SHIPPED | OUTPATIENT
Start: 2021-06-14 | End: 2021-06-22 | Stop reason: SDUPTHER

## 2021-06-22 DIAGNOSIS — F98.8 ATTENTION DEFICIT DISORDER, UNSPECIFIED HYPERACTIVITY PRESENCE: ICD-10-CM

## 2021-06-23 RX ORDER — DEXTROAMPHETAMINE SACCHARATE, AMPHETAMINE ASPARTATE, DEXTROAMPHETAMINE SULFATE AND AMPHETAMINE SULFATE 3.75; 3.75; 3.75; 3.75 MG/1; MG/1; MG/1; MG/1
15 TABLET ORAL DAILY
Qty: 30 TABLET | Refills: 0 | Status: SHIPPED | OUTPATIENT
Start: 2021-06-23 | End: 2021-06-28

## 2021-06-28 ENCOUNTER — OFFICE VISIT (OUTPATIENT)
Dept: FAMILY MEDICINE CLINIC | Facility: CLINIC | Age: 26
End: 2021-06-28

## 2021-06-28 VITALS
TEMPERATURE: 97.3 F | DIASTOLIC BLOOD PRESSURE: 78 MMHG | SYSTOLIC BLOOD PRESSURE: 118 MMHG | OXYGEN SATURATION: 98 % | WEIGHT: 166 LBS | HEART RATE: 76 BPM | BODY MASS INDEX: 23.24 KG/M2 | HEIGHT: 71 IN

## 2021-06-28 DIAGNOSIS — F41.1 GENERALIZED ANXIETY DISORDER: ICD-10-CM

## 2021-06-28 DIAGNOSIS — F98.8 ATTENTION DEFICIT DISORDER, UNSPECIFIED HYPERACTIVITY PRESENCE: Primary | ICD-10-CM

## 2021-06-28 PROCEDURE — 99213 OFFICE O/P EST LOW 20 MIN: CPT | Performed by: FAMILY MEDICINE

## 2021-06-28 RX ORDER — DEXTROAMPHETAMINE SACCHARATE, AMPHETAMINE ASPARTATE, DEXTROAMPHETAMINE SULFATE AND AMPHETAMINE SULFATE 5; 5; 5; 5 MG/1; MG/1; MG/1; MG/1
20 TABLET ORAL DAILY
Qty: 30 TABLET | Refills: 0 | Status: SHIPPED | OUTPATIENT
Start: 2021-06-28 | End: 2021-08-03 | Stop reason: SDUPTHER

## 2021-06-28 NOTE — PROGRESS NOTES
Subjective   Zoë Jarquin is a 25 y.o. female.     History of Present Illness   The patient presents for 3-month follow-up on ADD, anxiety and medication refill.  She is complaining of anxiety and difficulty concentration but denies depressed mood, trouble sleeping, irritability, nausea, palpitation and shortness of breath.  She is taking Adderall noticed partial improvement in symptoms.  She denies any medication related side effects.    The following portions of the patient's history were reviewed and updated as appropriate: past medical history, past social history, past surgical history and problem list.    Review of Systems   Cardiovascular: Negative for chest pain and palpitations.   Neurological: Negative for headache.   Psychiatric/Behavioral: Positive for decreased concentration. Negative for sleep disturbance, suicidal ideas and depressed mood. The patient is nervous/anxious.        Objective   Physical Exam  Vitals reviewed.   Constitutional:       Appearance: She is well-developed.   Neck:      Thyroid: No thyromegaly.   Pulmonary:      Effort: Pulmonary effort is normal.      Breath sounds: Normal breath sounds. No wheezing.   Musculoskeletal:      Cervical back: Normal range of motion.   Neurological:      Mental Status: She is alert and oriented to person, place, and time.   Psychiatric:         Mood and Affect: Mood normal.         Behavior: Behavior normal.       Vitals:    06/28/21 1407   BP: 118/78   Pulse: 76   Temp: 97.3 °F (36.3 °C)   SpO2: 98%     Current Outpatient Medications on File Prior to Visit   Medication Sig Dispense Refill   • venlafaxine XR (EFFEXOR-XR) 37.5 MG 24 hr capsule Take 1 capsule by mouth Daily. 90 capsule 0     No current facility-administered medications on file prior to visit.         Assessment/Plan   Problems Addressed this Visit        Mental Health    Anxiety disorder     Anxiety is is stable on Effexor.  Patient verified not suicidal at this time.          Relevant Medications    amphetamine-dextroamphetamine (Adderall) 20 MG tablet    Attention deficit disorder - Primary     ADD symptoms are partially improving increase Adderall 20 mg p.o. daily.         Relevant Medications    amphetamine-dextroamphetamine (Adderall) 20 MG tablet      Diagnoses       Codes Comments    Attention deficit disorder, unspecified hyperactivity presence    -  Primary ICD-10-CM: F98.8  ICD-9-CM: 314.00     Generalized anxiety disorder     ICD-10-CM: F41.1  ICD-9-CM: 300.02

## 2021-07-22 PROCEDURE — U0003 INFECTIOUS AGENT DETECTION BY NUCLEIC ACID (DNA OR RNA); SEVERE ACUTE RESPIRATORY SYNDROME CORONAVIRUS 2 (SARS-COV-2) (CORONAVIRUS DISEASE [COVID-19]), AMPLIFIED PROBE TECHNIQUE, MAKING USE OF HIGH THROUGHPUT TECHNOLOGIES AS DESCRIBED BY CMS-2020-01-R: HCPCS | Performed by: FAMILY MEDICINE

## 2021-08-03 DIAGNOSIS — F98.8 ATTENTION DEFICIT DISORDER, UNSPECIFIED HYPERACTIVITY PRESENCE: ICD-10-CM

## 2021-08-03 RX ORDER — DEXTROAMPHETAMINE SACCHARATE, AMPHETAMINE ASPARTATE, DEXTROAMPHETAMINE SULFATE AND AMPHETAMINE SULFATE 5; 5; 5; 5 MG/1; MG/1; MG/1; MG/1
20 TABLET ORAL DAILY
Qty: 30 TABLET | Refills: 0 | Status: SHIPPED | OUTPATIENT
Start: 2021-08-03 | End: 2021-09-18 | Stop reason: SDUPTHER

## 2021-08-03 RX ORDER — VENLAFAXINE HYDROCHLORIDE 37.5 MG/1
37.5 CAPSULE, EXTENDED RELEASE ORAL DAILY
Qty: 90 CAPSULE | Refills: 0 | Status: SHIPPED | OUTPATIENT
Start: 2021-08-03 | End: 2021-09-01

## 2021-09-01 RX ORDER — VENLAFAXINE HYDROCHLORIDE 37.5 MG/1
37.5 CAPSULE, EXTENDED RELEASE ORAL DAILY
Qty: 90 CAPSULE | Refills: 0 | Status: SHIPPED | OUTPATIENT
Start: 2021-09-01 | End: 2021-09-18 | Stop reason: SDUPTHER

## 2021-09-18 DIAGNOSIS — F98.8 ATTENTION DEFICIT DISORDER, UNSPECIFIED HYPERACTIVITY PRESENCE: ICD-10-CM

## 2021-09-19 RX ORDER — VENLAFAXINE HYDROCHLORIDE 37.5 MG/1
37.5 CAPSULE, EXTENDED RELEASE ORAL DAILY
Qty: 90 CAPSULE | Refills: 0 | Status: SHIPPED | OUTPATIENT
Start: 2021-09-19 | End: 2021-12-08 | Stop reason: SDUPTHER

## 2021-09-19 RX ORDER — DEXTROAMPHETAMINE SACCHARATE, AMPHETAMINE ASPARTATE, DEXTROAMPHETAMINE SULFATE AND AMPHETAMINE SULFATE 5; 5; 5; 5 MG/1; MG/1; MG/1; MG/1
20 TABLET ORAL DAILY
Qty: 30 TABLET | Refills: 0 | Status: SHIPPED | OUTPATIENT
Start: 2021-09-19 | End: 2021-11-02 | Stop reason: SDUPTHER

## 2021-10-26 DIAGNOSIS — F98.8 ATTENTION DEFICIT DISORDER, UNSPECIFIED HYPERACTIVITY PRESENCE: ICD-10-CM

## 2021-10-27 RX ORDER — DEXTROAMPHETAMINE SACCHARATE, AMPHETAMINE ASPARTATE, DEXTROAMPHETAMINE SULFATE AND AMPHETAMINE SULFATE 5; 5; 5; 5 MG/1; MG/1; MG/1; MG/1
20 TABLET ORAL DAILY
Qty: 30 TABLET | Refills: 0 | OUTPATIENT
Start: 2021-10-27

## 2021-11-02 ENCOUNTER — TELEMEDICINE (OUTPATIENT)
Dept: FAMILY MEDICINE CLINIC | Facility: CLINIC | Age: 26
End: 2021-11-02

## 2021-11-02 DIAGNOSIS — F41.1 GENERALIZED ANXIETY DISORDER: ICD-10-CM

## 2021-11-02 DIAGNOSIS — F98.8 ATTENTION DEFICIT DISORDER, UNSPECIFIED HYPERACTIVITY PRESENCE: Primary | ICD-10-CM

## 2021-11-02 PROCEDURE — 99213 OFFICE O/P EST LOW 20 MIN: CPT | Performed by: FAMILY MEDICINE

## 2021-11-02 RX ORDER — DEXTROAMPHETAMINE SACCHARATE, AMPHETAMINE ASPARTATE, DEXTROAMPHETAMINE SULFATE AND AMPHETAMINE SULFATE 5; 5; 5; 5 MG/1; MG/1; MG/1; MG/1
20 TABLET ORAL DAILY
Qty: 30 TABLET | Refills: 0 | Status: SHIPPED | OUTPATIENT
Start: 2021-11-02 | End: 2021-12-06 | Stop reason: SDUPTHER

## 2021-11-02 NOTE — PROGRESS NOTES
Subjective   Zoë Jarquin is a 25 y.o. female.     You have chosen to receive care through a telehealth visit.  Do you consent to use a video/audio connection for your medical care today? Yes.    Telehealth visit in view of COVID-19 pandemic.  Patient presents with 3-month follow-up on ADD and anxiety.         The patient present  for 3 months f/u on ADD and med refill. She is doing well taking adderall denies any side effects. She denies difficulity concentration, focusing, anxiety, depression and  insomnia.          The following portions of the patient's history were reviewed and updated as appropriate: past medical history, past social history, past surgical history and problem list.    Review of Systems   Constitutional: Negative for fatigue.   Cardiovascular: Negative for palpitations.   Psychiatric/Behavioral: Negative for decreased concentration, sleep disturbance and suicidal ideas. The patient is not nervous/anxious.        Objective   Physical Exam  Neurological:      Mental Status: She is alert and oriented to person, place, and time.   Psychiatric:         Mood and Affect: Mood normal.       Current Outpatient Medications on File Prior to Visit   Medication Sig Dispense Refill   • venlafaxine XR (EFFEXOR-XR) 37.5 MG 24 hr capsule Take 1 capsule by mouth Daily. 90 capsule 0   • [DISCONTINUED] amphetamine-dextroamphetamine (Adderall) 20 MG tablet Take 1 tablet by mouth Daily. 30 tablet 0   • [DISCONTINUED] benzonatate (TESSALON) 200 MG capsule 1 capsule p.o. every 8 hours as needed cough 21 capsule 0   • [DISCONTINUED] Chlorcyclizine-Pseudoephed 25-60 MG tablet 1 tablet p.o. every 8 hours as needed nasal congestion and drainage 15 tablet 0     No current facility-administered medications on file prior to visit.           Assessment/Plan   Problems Addressed this Visit        Mental Health    Anxiety disorder     Anxiety is improving continue Effexor, discussed lifestyle modification and exercise.          Relevant Medications    amphetamine-dextroamphetamine (Adderall) 20 MG tablet    Attention deficit disorder - Primary     ADD symptoms are improving continue current dose of Adderall.         Relevant Medications    amphetamine-dextroamphetamine (Adderall) 20 MG tablet      Diagnoses       Codes Comments    Attention deficit disorder, unspecified hyperactivity presence    -  Primary ICD-10-CM: F98.8  ICD-9-CM: 314.00     Generalized anxiety disorder     ICD-10-CM: F41.1  ICD-9-CM: 300.02

## 2021-12-06 DIAGNOSIS — F98.8 ATTENTION DEFICIT DISORDER, UNSPECIFIED HYPERACTIVITY PRESENCE: ICD-10-CM

## 2021-12-06 RX ORDER — DEXTROAMPHETAMINE SACCHARATE, AMPHETAMINE ASPARTATE, DEXTROAMPHETAMINE SULFATE AND AMPHETAMINE SULFATE 5; 5; 5; 5 MG/1; MG/1; MG/1; MG/1
20 TABLET ORAL DAILY
Qty: 30 TABLET | Refills: 0 | Status: SHIPPED | OUTPATIENT
Start: 2021-12-06 | End: 2021-12-14 | Stop reason: SDUPTHER

## 2021-12-08 DIAGNOSIS — F98.8 ATTENTION DEFICIT DISORDER, UNSPECIFIED HYPERACTIVITY PRESENCE: ICD-10-CM

## 2021-12-08 RX ORDER — VENLAFAXINE HYDROCHLORIDE 37.5 MG/1
37.5 CAPSULE, EXTENDED RELEASE ORAL DAILY
Qty: 90 CAPSULE | Refills: 0 | Status: SHIPPED | OUTPATIENT
Start: 2021-12-08 | End: 2021-12-10

## 2021-12-08 RX ORDER — DEXTROAMPHETAMINE SACCHARATE, AMPHETAMINE ASPARTATE, DEXTROAMPHETAMINE SULFATE AND AMPHETAMINE SULFATE 5; 5; 5; 5 MG/1; MG/1; MG/1; MG/1
20 TABLET ORAL DAILY
Qty: 30 TABLET | Refills: 0 | OUTPATIENT
Start: 2021-12-08

## 2021-12-10 RX ORDER — VENLAFAXINE HYDROCHLORIDE 37.5 MG/1
37.5 CAPSULE, EXTENDED RELEASE ORAL DAILY
Qty: 90 CAPSULE | Refills: 0 | Status: SHIPPED | OUTPATIENT
Start: 2021-12-10 | End: 2021-12-14 | Stop reason: SDUPTHER

## 2021-12-14 DIAGNOSIS — F98.8 ATTENTION DEFICIT DISORDER, UNSPECIFIED HYPERACTIVITY PRESENCE: ICD-10-CM

## 2021-12-14 RX ORDER — VENLAFAXINE HYDROCHLORIDE 37.5 MG/1
37.5 CAPSULE, EXTENDED RELEASE ORAL DAILY
Qty: 90 CAPSULE | Refills: 0 | Status: SHIPPED | OUTPATIENT
Start: 2021-12-14 | End: 2022-03-21 | Stop reason: SDUPTHER

## 2021-12-14 RX ORDER — DEXTROAMPHETAMINE SACCHARATE, AMPHETAMINE ASPARTATE, DEXTROAMPHETAMINE SULFATE AND AMPHETAMINE SULFATE 5; 5; 5; 5 MG/1; MG/1; MG/1; MG/1
20 TABLET ORAL DAILY
Qty: 30 TABLET | Refills: 0 | Status: SHIPPED | OUTPATIENT
Start: 2021-12-14 | End: 2022-01-16 | Stop reason: SDUPTHER

## 2022-01-16 DIAGNOSIS — F98.8 ATTENTION DEFICIT DISORDER, UNSPECIFIED HYPERACTIVITY PRESENCE: ICD-10-CM

## 2022-01-17 RX ORDER — DEXTROAMPHETAMINE SACCHARATE, AMPHETAMINE ASPARTATE, DEXTROAMPHETAMINE SULFATE AND AMPHETAMINE SULFATE 5; 5; 5; 5 MG/1; MG/1; MG/1; MG/1
20 TABLET ORAL DAILY
Qty: 30 TABLET | Refills: 0 | Status: SHIPPED | OUTPATIENT
Start: 2022-01-17 | End: 2022-03-21 | Stop reason: SDUPTHER

## 2022-03-21 DIAGNOSIS — F98.8 ATTENTION DEFICIT DISORDER, UNSPECIFIED HYPERACTIVITY PRESENCE: ICD-10-CM

## 2022-03-22 RX ORDER — VENLAFAXINE HYDROCHLORIDE 37.5 MG/1
37.5 CAPSULE, EXTENDED RELEASE ORAL DAILY
Qty: 30 CAPSULE | Refills: 0 | Status: SHIPPED | OUTPATIENT
Start: 2022-03-22 | End: 2022-04-28 | Stop reason: SDUPTHER

## 2022-03-22 RX ORDER — DEXTROAMPHETAMINE SACCHARATE, AMPHETAMINE ASPARTATE, DEXTROAMPHETAMINE SULFATE AND AMPHETAMINE SULFATE 5; 5; 5; 5 MG/1; MG/1; MG/1; MG/1
20 TABLET ORAL DAILY
Qty: 30 TABLET | Refills: 0 | Status: SHIPPED | OUTPATIENT
Start: 2022-03-22 | End: 2022-05-03 | Stop reason: SDUPTHER

## 2022-04-21 DIAGNOSIS — F98.8 ATTENTION DEFICIT DISORDER, UNSPECIFIED HYPERACTIVITY PRESENCE: ICD-10-CM

## 2022-04-21 RX ORDER — DEXTROAMPHETAMINE SACCHARATE, AMPHETAMINE ASPARTATE, DEXTROAMPHETAMINE SULFATE AND AMPHETAMINE SULFATE 5; 5; 5; 5 MG/1; MG/1; MG/1; MG/1
20 TABLET ORAL DAILY
Qty: 30 TABLET | Refills: 0 | OUTPATIENT
Start: 2022-04-21

## 2022-04-21 RX ORDER — VENLAFAXINE HYDROCHLORIDE 37.5 MG/1
37.5 CAPSULE, EXTENDED RELEASE ORAL DAILY
Qty: 30 CAPSULE | Refills: 0 | OUTPATIENT
Start: 2022-04-21

## 2022-04-28 RX ORDER — VENLAFAXINE HYDROCHLORIDE 37.5 MG/1
37.5 CAPSULE, EXTENDED RELEASE ORAL DAILY
Qty: 30 CAPSULE | Refills: 0 | Status: SHIPPED | OUTPATIENT
Start: 2022-04-28 | End: 2022-05-03 | Stop reason: SDUPTHER

## 2022-04-28 NOTE — TELEPHONE ENCOUNTER
Caller: Zoë Jarquin FRANCISCO    Relationship: Self    Best call back number:[phone]    Requested Prescriptions:   Requested Prescriptions     Pending Prescriptions Disp Refills   • venlafaxine XR (EFFEXOR-XR) 37.5 MG 24 hr capsule 30 capsule 0     Sig: Take 1 capsule by mouth Daily.        Pharmacy where request should be sent: [facility] - [place]     Additional details provided by patient:PATIENT HAS BEEN OUT OF THIS MEDICATION FOR 3 DAYS.   PATIENT HAS APPOINTMENT FOR MED CHECK 5/3 BUT WOULD LIKE AT LEAST A REFILL UNTIL THEN IF POSSIBLE.    PLEASE ADVISE    Does the patient have less than a 3 day supply:  [x] Yes  [] No    Sumaya STYLES Rep   04/28/22 14:35 EDT

## 2022-05-03 ENCOUNTER — TELEMEDICINE (OUTPATIENT)
Dept: FAMILY MEDICINE CLINIC | Facility: CLINIC | Age: 27
End: 2022-05-03

## 2022-05-03 DIAGNOSIS — F98.8 ATTENTION DEFICIT DISORDER, UNSPECIFIED HYPERACTIVITY PRESENCE: Primary | ICD-10-CM

## 2022-05-03 DIAGNOSIS — F41.1 GENERALIZED ANXIETY DISORDER: ICD-10-CM

## 2022-05-03 PROCEDURE — 99213 OFFICE O/P EST LOW 20 MIN: CPT | Performed by: FAMILY MEDICINE

## 2022-05-03 RX ORDER — VENLAFAXINE HYDROCHLORIDE 37.5 MG/1
37.5 CAPSULE, EXTENDED RELEASE ORAL DAILY
Qty: 90 CAPSULE | Refills: 3 | Status: SHIPPED | OUTPATIENT
Start: 2022-05-03 | End: 2022-09-15

## 2022-05-03 RX ORDER — DEXTROAMPHETAMINE SACCHARATE, AMPHETAMINE ASPARTATE, DEXTROAMPHETAMINE SULFATE AND AMPHETAMINE SULFATE 5; 5; 5; 5 MG/1; MG/1; MG/1; MG/1
20 TABLET ORAL DAILY
Qty: 30 TABLET | Refills: 0 | Status: SHIPPED | OUTPATIENT
Start: 2022-05-03 | End: 2022-05-26 | Stop reason: SDUPTHER

## 2022-05-03 NOTE — PROGRESS NOTES
Subjective   Zoë Jarquin is a 26 y.o. female.     You have chosen to receive care through a telehealth visit.  Do you consent to use a video/audio connection for your medical care today?  Yes.    Telehealth visit in view of COVID-19 pandemic.  Patient presented with 3-month follow-up on ADD and medication refill. She is doing well taking  adderall as prescribed and tolerating well.  She  denies difficulity concentration, focusing, anxiety, depression, and  insomnia.             The following portions of the patient's history were reviewed and updated as appropriate: past medical history, past social history, past surgical history and problem list.    Review of Systems   Constitutional: Negative for fatigue.   Neurological: Negative for headache.   Psychiatric/Behavioral: Negative for behavioral problems, decreased concentration, sleep disturbance, suicidal ideas and depressed mood. The patient is not nervous/anxious.        Objective   Physical Exam  Neurological:      Mental Status: She is alert and oriented to person, place, and time.   Psychiatric:         Mood and Affect: Mood normal.         Behavior: Behavior normal.     There were no vitals filed for this visit.  No current outpatient medications on file prior to visit.     No current facility-administered medications on file prior to visit.           Assessment/Plan   Problems Addressed this Visit        Mental Health    Anxiety disorder     Psychological condition is improving with treatment.  Continue current treatment regimen.  Regular aerobic exercise.  Psychological condition  will be reassessed in 3 months.           Relevant Medications    venlafaxine XR (EFFEXOR-XR) 37.5 MG 24 hr capsule    amphetamine-dextroamphetamine (Adderall) 20 MG tablet    Attention deficit disorder - Primary     ADD symptoms are stable at the current dose of Adderall.  Discussed lifestyle modification and exercise.   follow-up in 3 months.    This is a video visit patient  was at her work and I was.in the office.  [email or web address] was used to complete this visit. Total time of discussion was 20 minutes.             Relevant Medications    venlafaxine XR (EFFEXOR-XR) 37.5 MG 24 hr capsule    amphetamine-dextroamphetamine (Adderall) 20 MG tablet      Diagnoses       Codes Comments    Attention deficit disorder, unspecified hyperactivity presence    -  Primary ICD-10-CM: F98.8  ICD-9-CM: 314.00     Generalized anxiety disorder     ICD-10-CM: F41.1  ICD-9-CM: 300.02

## 2022-05-04 NOTE — ASSESSMENT & PLAN NOTE
ADD symptoms are stable at the current dose of Adderall.  Discussed lifestyle modification and exercise.   follow-up in 3 months.    This is a video visit patient was at her work and I was.in the office.  [employer] alley. was used to complete this visit. Total time of discussion was 20 minutes.

## 2022-05-26 DIAGNOSIS — F98.8 ATTENTION DEFICIT DISORDER, UNSPECIFIED HYPERACTIVITY PRESENCE: ICD-10-CM

## 2022-05-26 RX ORDER — DEXTROAMPHETAMINE SACCHARATE, AMPHETAMINE ASPARTATE, DEXTROAMPHETAMINE SULFATE AND AMPHETAMINE SULFATE 5; 5; 5; 5 MG/1; MG/1; MG/1; MG/1
20 TABLET ORAL DAILY
Qty: 30 TABLET | Refills: 0 | Status: SHIPPED | OUTPATIENT
Start: 2022-05-26 | End: 2022-07-08 | Stop reason: SDUPTHER

## 2022-07-08 ENCOUNTER — OFFICE VISIT (OUTPATIENT)
Dept: FAMILY MEDICINE CLINIC | Facility: CLINIC | Age: 27
End: 2022-07-08

## 2022-07-08 VITALS
BODY MASS INDEX: 23.27 KG/M2 | TEMPERATURE: 97.8 F | HEART RATE: 74 BPM | SYSTOLIC BLOOD PRESSURE: 122 MMHG | OXYGEN SATURATION: 98 % | DIASTOLIC BLOOD PRESSURE: 84 MMHG | HEIGHT: 71 IN | RESPIRATION RATE: 16 BRPM | WEIGHT: 166.2 LBS

## 2022-07-08 DIAGNOSIS — F98.8 ATTENTION DEFICIT DISORDER, UNSPECIFIED HYPERACTIVITY PRESENCE: Primary | ICD-10-CM

## 2022-07-08 DIAGNOSIS — F41.1 GENERALIZED ANXIETY DISORDER: ICD-10-CM

## 2022-07-08 PROCEDURE — 99213 OFFICE O/P EST LOW 20 MIN: CPT | Performed by: FAMILY MEDICINE

## 2022-07-08 RX ORDER — DEXTROAMPHETAMINE SACCHARATE, AMPHETAMINE ASPARTATE, DEXTROAMPHETAMINE SULFATE AND AMPHETAMINE SULFATE 5; 5; 5; 5 MG/1; MG/1; MG/1; MG/1
20 TABLET ORAL DAILY
Qty: 30 TABLET | Refills: 0 | Status: SHIPPED | OUTPATIENT
Start: 2022-07-08 | End: 2022-08-25 | Stop reason: SDUPTHER

## 2022-07-08 NOTE — PROGRESS NOTES
Subjective   Zoë Jarquin is a 26 y.o. female.     History of Present Illness     The patient present  for 3 months f/u on ADD, anxiety and med refill. She is doing well, tolerating adderall well. She denies difficulity concentration, focusing, anxiety, depression, and  insomnia.        The following portions of the patient's history were reviewed and updated as appropriate: past medical history, past social history, past surgical history and problem list.    Review of Systems   Constitutional: Negative for appetite change and fatigue.   Respiratory: Negative for shortness of breath.    Cardiovascular: Negative for chest pain and palpitations.   Neurological: Negative for headache.   Psychiatric/Behavioral: Negative for behavioral problems, decreased concentration and sleep disturbance. The patient is not nervous/anxious.        Objective   Physical Exam  Vitals reviewed.   Constitutional:       General: She is not in acute distress.  Pulmonary:      Effort: Pulmonary effort is normal.   Neurological:      Mental Status: She is alert and oriented to person, place, and time.   Psychiatric:         Mood and Affect: Mood normal.         Behavior: Behavior normal.       Vitals:    07/08/22 1419   BP: 122/84   Pulse: 74   Resp: 16   Temp: 97.8 °F (36.6 °C)   SpO2: 98%     Current Outpatient Medications on File Prior to Visit   Medication Sig Dispense Refill   • venlafaxine XR (EFFEXOR-XR) 37.5 MG 24 hr capsule Take 1 capsule by mouth Daily. 90 capsule 3   • [DISCONTINUED] amphetamine-dextroamphetamine (Adderall) 20 MG tablet Take 1 tablet by mouth Daily. 30 tablet 0     No current facility-administered medications on file prior to visit.           Assessment & Plan   Problems Addressed this Visit        Mental Health    Anxiety disorder     Stable- continue effexor           Relevant Medications    amphetamine-dextroamphetamine (Adderall) 20 MG tablet    Attention deficit disorder - Primary     Psychological condition  is improving with treatment.  Continue Adderall.  Regular aerobic exercise.  Psychological condition  will be reassessed in 3 months.           Relevant Medications    amphetamine-dextroamphetamine (Adderall) 20 MG tablet      Diagnoses       Codes Comments    Attention deficit disorder, unspecified hyperactivity presence    -  Primary ICD-10-CM: F98.8  ICD-9-CM: 314.00     Generalized anxiety disorder     ICD-10-CM: F41.1  ICD-9-CM: 300.02

## 2022-07-09 NOTE — ASSESSMENT & PLAN NOTE
Psychological condition is improving with treatment.  Continue Adderall.  Regular aerobic exercise.  Psychological condition  will be reassessed in 3 months.

## 2022-08-25 DIAGNOSIS — F98.8 ATTENTION DEFICIT DISORDER, UNSPECIFIED HYPERACTIVITY PRESENCE: ICD-10-CM

## 2022-08-26 RX ORDER — DEXTROAMPHETAMINE SACCHARATE, AMPHETAMINE ASPARTATE, DEXTROAMPHETAMINE SULFATE AND AMPHETAMINE SULFATE 5; 5; 5; 5 MG/1; MG/1; MG/1; MG/1
20 TABLET ORAL DAILY
Qty: 30 TABLET | Refills: 0 | Status: SHIPPED | OUTPATIENT
Start: 2022-08-26 | End: 2022-10-07 | Stop reason: DRUGHIGH

## 2022-09-15 RX ORDER — VENLAFAXINE HYDROCHLORIDE 37.5 MG/1
CAPSULE, EXTENDED RELEASE ORAL
Qty: 90 CAPSULE | Refills: 1 | Status: SHIPPED | OUTPATIENT
Start: 2022-09-15 | End: 2022-09-15 | Stop reason: SDUPTHER

## 2022-09-15 RX ORDER — VENLAFAXINE HYDROCHLORIDE 37.5 MG/1
37.5 CAPSULE, EXTENDED RELEASE ORAL DAILY
Qty: 90 CAPSULE | Refills: 1 | Status: SHIPPED | OUTPATIENT
Start: 2022-09-15 | End: 2023-02-23

## 2022-10-07 ENCOUNTER — OFFICE VISIT (OUTPATIENT)
Dept: FAMILY MEDICINE CLINIC | Facility: CLINIC | Age: 27
End: 2022-10-07

## 2022-10-07 VITALS
RESPIRATION RATE: 16 BRPM | HEART RATE: 90 BPM | DIASTOLIC BLOOD PRESSURE: 65 MMHG | BODY MASS INDEX: 23.1 KG/M2 | OXYGEN SATURATION: 97 % | TEMPERATURE: 98 F | HEIGHT: 71 IN | SYSTOLIC BLOOD PRESSURE: 112 MMHG | WEIGHT: 165 LBS

## 2022-10-07 DIAGNOSIS — F98.8 ATTENTION DEFICIT DISORDER, UNSPECIFIED HYPERACTIVITY PRESENCE: Primary | ICD-10-CM

## 2022-10-07 DIAGNOSIS — Z23 NEED FOR VACCINATION: ICD-10-CM

## 2022-10-07 DIAGNOSIS — F41.1 GENERALIZED ANXIETY DISORDER: ICD-10-CM

## 2022-10-07 PROCEDURE — 99213 OFFICE O/P EST LOW 20 MIN: CPT | Performed by: FAMILY MEDICINE

## 2022-10-07 PROCEDURE — 90686 IIV4 VACC NO PRSV 0.5 ML IM: CPT | Performed by: FAMILY MEDICINE

## 2022-10-07 PROCEDURE — 90471 IMMUNIZATION ADMIN: CPT | Performed by: FAMILY MEDICINE

## 2022-10-07 RX ORDER — DEXTROAMPHETAMINE SACCHARATE, AMPHETAMINE ASPARTATE MONOHYDRATE, DEXTROAMPHETAMINE SULFATE AND AMPHETAMINE SULFATE 6.25; 6.25; 6.25; 6.25 MG/1; MG/1; MG/1; MG/1
25 CAPSULE, EXTENDED RELEASE ORAL EVERY MORNING
Qty: 30 CAPSULE | Refills: 0 | Status: SHIPPED | OUTPATIENT
Start: 2022-10-07 | End: 2022-11-04 | Stop reason: SDUPTHER

## 2022-10-07 NOTE — PROGRESS NOTES
Subjective   Zoë Jarquin is a 26 y.o. female.     History of Present Illness     ADD  The patient present  for 3 months f/u on ADD, anxiety and med refill. She denies difficulity concentration, focusing, anxiety, depression, and insomnia.  She is taking Adderall and tolerating well.     The following portions of the patient's history were reviewed and updated as appropriate: past medical history, past social history, past surgical history and problem list.    Review of Systems   Respiratory: Negative for shortness of breath.    Psychiatric/Behavioral: Negative for behavioral problems, decreased concentration, sleep disturbance and depressed mood. The patient is not nervous/anxious.        Objective   Physical Exam  Vitals reviewed.   Pulmonary:      Effort: Pulmonary effort is normal.   Neurological:      Mental Status: She is alert and oriented to person, place, and time.   Psychiatric:         Mood and Affect: Mood normal.       Vitals:    10/07/22 1120   BP: 112/65   Pulse: 90   Resp: 16   Temp: 98 °F (36.7 °C)   SpO2: 97%     Current Outpatient Medications on File Prior to Visit   Medication Sig Dispense Refill   • venlafaxine XR (EFFEXOR-XR) 37.5 MG 24 hr capsule Take 1 capsule by mouth Daily. 90 capsule 1     No current facility-administered medications on file prior to visit.           Assessment & Plan   Problems Addressed this Visit        Infectious Diseases    Need for vaccination    Relevant Orders    FluLaval/Fluzone >6 mos (6217-7291) (Completed)       Mental Health    Anxiety disorder     Anxiety symptoms are stable continue Effexor.         Relevant Medications    amphetamine-dextroamphetamine XR (Adderall XR) 25 MG 24 hr capsule    Attention deficit disorder - Primary     ADD symptoms are partially improving increase Adderall 25 mg p.o. daily. Discussed dietary changes and lifestyle modification.         Relevant Medications    amphetamine-dextroamphetamine XR (Adderall XR) 25 MG 24 hr capsule    Diagnoses       Codes Comments    Attention deficit disorder, unspecified hyperactivity presence    -  Primary ICD-10-CM: F98.8  ICD-9-CM: 314.00     Generalized anxiety disorder     ICD-10-CM: F41.1  ICD-9-CM: 300.02     Need for vaccination     ICD-10-CM: Z23  ICD-9-CM: V05.9

## 2022-10-16 PROBLEM — Z23 NEED FOR VACCINATION: Status: ACTIVE | Noted: 2022-10-16

## 2022-10-17 NOTE — ASSESSMENT & PLAN NOTE
ADD symptoms are partially improving increase Adderall 25 mg p.o. daily. Discussed dietary changes and lifestyle modification.

## 2022-11-03 ENCOUNTER — OFFICE VISIT (OUTPATIENT)
Dept: FAMILY MEDICINE CLINIC | Facility: CLINIC | Age: 27
End: 2022-11-03

## 2022-11-03 VITALS
HEART RATE: 78 BPM | RESPIRATION RATE: 16 BRPM | OXYGEN SATURATION: 98 % | WEIGHT: 167.2 LBS | TEMPERATURE: 98 F | HEIGHT: 71 IN | SYSTOLIC BLOOD PRESSURE: 119 MMHG | DIASTOLIC BLOOD PRESSURE: 66 MMHG | BODY MASS INDEX: 23.41 KG/M2

## 2022-11-03 DIAGNOSIS — F98.8 ATTENTION DEFICIT DISORDER, UNSPECIFIED HYPERACTIVITY PRESENCE: Primary | ICD-10-CM

## 2022-11-03 DIAGNOSIS — F41.8 MIXED ANXIETY AND DEPRESSIVE DISORDER: ICD-10-CM

## 2022-11-03 PROCEDURE — 99213 OFFICE O/P EST LOW 20 MIN: CPT | Performed by: FAMILY MEDICINE

## 2022-11-03 NOTE — PROGRESS NOTES
Donald Jarquin is a 26 y.o. female.     History of Present Illness     The patient present  for follow up on ADD and anxiety depression. She is C/O anxiety and depression episode but denies difficulity concentration, focusing and  insomnia.        The following portions of the patient's history were reviewed and updated as appropriate: past medical history, past social history, past surgical history and problem list.    Review of Systems   Respiratory: Negative for shortness of breath.    Cardiovascular: Negative for palpitations.   Neurological: Negative for headache.   Psychiatric/Behavioral: Positive for depressed mood and stress. Negative for decreased concentration and suicidal ideas. The patient is nervous/anxious.        Objective   Physical Exam  Vitals reviewed.   Constitutional:       Appearance: She is well-developed.   Neck:      Thyroid: No thyromegaly.   Pulmonary:      Effort: Pulmonary effort is normal.      Breath sounds: Normal breath sounds.   Musculoskeletal:         General: Normal range of motion.   Neurological:      Mental Status: She is alert and oriented to person, place, and time.   Psychiatric:         Mood and Affect: Mood normal.         Behavior: Behavior normal.       Vitals:    11/03/22 1029   BP: 119/66   Pulse: 78   Resp: 16   Temp: 98 °F (36.7 °C)   SpO2: 98%     Current Outpatient Medications on File Prior to Visit   Medication Sig Dispense Refill   • venlafaxine XR (EFFEXOR-XR) 37.5 MG 24 hr capsule Take 1 capsule by mouth Daily. 90 capsule 1     No current facility-administered medications on file prior to visit.           Assessment & Plan   Problems Addressed this Visit        Mental Health    Mixed anxiety and depressive disorder     Patient's depression is recurrent and is mild without psychosis. Their depression is currently active and the condition is worsening. This will be reassessed in 4 weeks. F/U as described:patient will continue current medication  therapy and patient referred to Mental Health Specialist.         Relevant Medications    amphetamine-dextroamphetamine XR (Adderall XR) 25 MG 24 hr capsule    Other Relevant Orders    Ambulatory Referral to Psychiatry    Attention deficit disorder - Primary     Psychological condition is improving with treatment.  Continue current treatment regimen.  Regular aerobic exercise.  Psychological condition  will be reassessed in 3 months.         Relevant Medications    amphetamine-dextroamphetamine XR (Adderall XR) 25 MG 24 hr capsule   Diagnoses       Codes Comments    Attention deficit disorder, unspecified hyperactivity presence    -  Primary ICD-10-CM: F98.8  ICD-9-CM: 314.00     Mixed anxiety and depressive disorder     ICD-10-CM: F41.8  ICD-9-CM: 300.4

## 2022-11-04 RX ORDER — DEXTROAMPHETAMINE SACCHARATE, AMPHETAMINE ASPARTATE MONOHYDRATE, DEXTROAMPHETAMINE SULFATE AND AMPHETAMINE SULFATE 6.25; 6.25; 6.25; 6.25 MG/1; MG/1; MG/1; MG/1
25 CAPSULE, EXTENDED RELEASE ORAL EVERY MORNING
Qty: 30 CAPSULE | Refills: 0 | Status: SHIPPED | OUTPATIENT
Start: 2022-11-04 | End: 2022-11-11 | Stop reason: SDUPTHER

## 2022-11-05 NOTE — ASSESSMENT & PLAN NOTE
Patient's depression is recurrent and is mild without psychosis. Their depression is currently active and the condition is worsening. This will be reassessed in 4 weeks. F/U as described:patient will continue current medication therapy and patient referred to Mental Health Specialist.

## 2022-11-11 DIAGNOSIS — F98.8 ATTENTION DEFICIT DISORDER, UNSPECIFIED HYPERACTIVITY PRESENCE: ICD-10-CM

## 2022-11-13 RX ORDER — DEXTROAMPHETAMINE SACCHARATE, AMPHETAMINE ASPARTATE MONOHYDRATE, DEXTROAMPHETAMINE SULFATE AND AMPHETAMINE SULFATE 6.25; 6.25; 6.25; 6.25 MG/1; MG/1; MG/1; MG/1
25 CAPSULE, EXTENDED RELEASE ORAL EVERY MORNING
Qty: 30 CAPSULE | Refills: 0 | Status: SHIPPED | OUTPATIENT
Start: 2022-11-13

## 2023-02-23 RX ORDER — VENLAFAXINE HYDROCHLORIDE 37.5 MG/1
37.5 CAPSULE, EXTENDED RELEASE ORAL DAILY
Qty: 90 CAPSULE | Refills: 1 | Status: SHIPPED | OUTPATIENT
Start: 2023-02-23

## 2023-04-18 ENCOUNTER — OFFICE VISIT (OUTPATIENT)
Dept: FAMILY MEDICINE CLINIC | Facility: CLINIC | Age: 28
End: 2023-04-18
Payer: MEDICAID

## 2023-04-18 VITALS
HEART RATE: 78 BPM | HEIGHT: 71 IN | BODY MASS INDEX: 25.73 KG/M2 | WEIGHT: 183.8 LBS | DIASTOLIC BLOOD PRESSURE: 81 MMHG | TEMPERATURE: 98.6 F | SYSTOLIC BLOOD PRESSURE: 126 MMHG | OXYGEN SATURATION: 98 % | RESPIRATION RATE: 16 BRPM

## 2023-04-18 DIAGNOSIS — F41.8 MIXED ANXIETY AND DEPRESSIVE DISORDER: Primary | ICD-10-CM

## 2023-04-18 DIAGNOSIS — F98.8 ATTENTION DEFICIT DISORDER, UNSPECIFIED HYPERACTIVITY PRESENCE: ICD-10-CM

## 2023-04-18 PROCEDURE — 1160F RVW MEDS BY RX/DR IN RCRD: CPT | Performed by: FAMILY MEDICINE

## 2023-04-18 PROCEDURE — 99214 OFFICE O/P EST MOD 30 MIN: CPT | Performed by: FAMILY MEDICINE

## 2023-04-18 PROCEDURE — 1159F MED LIST DOCD IN RCRD: CPT | Performed by: FAMILY MEDICINE

## 2023-04-18 RX ORDER — VENLAFAXINE HYDROCHLORIDE 75 MG/1
75 CAPSULE, EXTENDED RELEASE ORAL DAILY
Qty: 90 CAPSULE | Refills: 3 | Status: SHIPPED | OUTPATIENT
Start: 2023-04-18

## 2023-04-18 NOTE — PROGRESS NOTES
Subjective   Zoë Jarquin is a 27 y.o. female.     History of Present Illness     The patient present  for follow-up on anxiety, depression and ADD.  Patient is stated she has stopped Adderall about 3-4 months ago due to medication side effects.  She is taking Effexor and tolerating well.  She is complaining of  anxiety and depression, trouble focusing but denies trouble sleep, chest pain, palpitation, dizziness and suicidal thoughts.       The following portions of the patient's history were reviewed and updated as appropriate: past medical history, past social history, past surgical history and problem list.    Review of Systems   Cardiovascular: Negative for chest pain and palpitations.   Neurological: Negative for dizziness and headache.   Psychiatric/Behavioral: Positive for decreased concentration and depressed mood. Negative for sleep disturbance and suicidal ideas. The patient is nervous/anxious.        Objective   Physical Exam  Vitals reviewed.   Pulmonary:      Effort: Pulmonary effort is normal.   Neurological:      Mental Status: She is alert and oriented to person, place, and time.   Psychiatric:         Mood and Affect: Mood normal.         Behavior: Behavior normal.       Vitals:    04/18/23 1400   BP: 126/81   Pulse: 78   Resp: 16   Temp: 98.6 °F (37 °C)   SpO2: 98%       Current Outpatient Medications on File Prior to Visit   Medication Sig Dispense Refill   • amphetamine-dextroamphetamine XR (Adderall XR) 25 MG 24 hr capsule Take 1 capsule by mouth Every Morning 30 capsule 0   • [DISCONTINUED] venlafaxine XR (EFFEXOR-XR) 37.5 MG 24 hr capsule TAKE 1 CAPSULE BY MOUTH DAILY 90 capsule 1     No current facility-administered medications on file prior to visit.         Assessment & Plan   Problems Addressed this Visit        Mental Health    Mixed anxiety and depressive disorder - Primary     Anxiety depression  is worsening discussed psych referral and antidepressive medications.  Increase Effexor 75  mg p.o. daily.  Refer to psych  The patient verified not suicidal at this time, she will call our office if there is  any worsening of Sx or thoughts of doing harm arise.             Relevant Medications    venlafaxine XR (EFFEXOR-XR) 75 MG 24 hr capsule    Other Relevant Orders    Ambulatory Referral to Psychiatry    Attention deficit disorder     Psychological condition is unchanged.  Patient does not want to start back on medication because of medication side effects.  Regular aerobic exercise.  Psychological condition  will be reassessed in 3 months.         Relevant Medications    venlafaxine XR (EFFEXOR-XR) 75 MG 24 hr capsule    Other Relevant Orders    Ambulatory Referral to Psychiatry   Diagnoses       Codes Comments    Mixed anxiety and depressive disorder    -  Primary ICD-10-CM: F41.8  ICD-9-CM: 300.4     Attention deficit disorder, unspecified hyperactivity presence     ICD-10-CM: F98.8  ICD-9-CM: 314.00

## 2023-04-18 NOTE — ASSESSMENT & PLAN NOTE
Anxiety depression  is worsening discussed psych referral and antidepressive medications.  Increase Effexor 75 mg p.o. daily.  Refer to psych  The patient verified not suicidal at this time, she will call our office if there is  any worsening of Sx or thoughts of doing harm arise.

## 2023-04-18 NOTE — ASSESSMENT & PLAN NOTE
Psychological condition is unchanged.  Patient does not want to start back on medication because of medication side effects.  Regular aerobic exercise.  Psychological condition  will be reassessed in 3 months.